# Patient Record
Sex: FEMALE | Race: BLACK OR AFRICAN AMERICAN | NOT HISPANIC OR LATINO | Employment: FULL TIME | RURAL
[De-identification: names, ages, dates, MRNs, and addresses within clinical notes are randomized per-mention and may not be internally consistent; named-entity substitution may affect disease eponyms.]

---

## 2017-05-04 ENCOUNTER — HISTORICAL (OUTPATIENT)
Dept: ADMINISTRATIVE | Facility: HOSPITAL | Age: 37
End: 2017-05-04

## 2017-05-08 LAB
LAB AP CLINICAL INFORMATION: NORMAL
LAB AP COMMENTS: NORMAL
LAB AP GENERAL CAT - HISTORICAL: NORMAL
LAB AP INTERPRETATION/RESULT - HISTORICAL: NEGATIVE
LAB AP SPECIMEN ADEQUACY - HISTORICAL: NORMAL
LAB AP SPECIMEN SUBMITTED - HISTORICAL: NORMAL

## 2018-11-20 ENCOUNTER — HISTORICAL (OUTPATIENT)
Dept: ADMINISTRATIVE | Facility: HOSPITAL | Age: 38
End: 2018-11-20

## 2018-11-23 LAB
LAB AP COMMENTS: NORMAL
LAB AP GENERAL CAT - HISTORICAL: NORMAL
LAB AP INTERPRETATION/RESULT - HISTORICAL: NEGATIVE
LAB AP SPECIMEN ADEQUACY - HISTORICAL: NORMAL
LAB AP SPECIMEN SUBMITTED - HISTORICAL: NORMAL

## 2020-06-05 ENCOUNTER — HISTORICAL (OUTPATIENT)
Dept: ADMINISTRATIVE | Facility: HOSPITAL | Age: 40
End: 2020-06-05

## 2020-06-10 LAB
LAB AP CLINICAL INFORMATION: NORMAL
LAB AP GENERAL CAT - HISTORICAL: NORMAL
LAB AP INTERPRETATION/RESULT - HISTORICAL: NEGATIVE
LAB AP SPECIMEN ADEQUACY - HISTORICAL: NORMAL
LAB AP SPECIMEN SUBMITTED - HISTORICAL: NORMAL

## 2020-11-12 ENCOUNTER — HISTORICAL (OUTPATIENT)
Dept: ADMINISTRATIVE | Facility: HOSPITAL | Age: 40
End: 2020-11-12

## 2021-01-22 ENCOUNTER — HISTORICAL (OUTPATIENT)
Dept: ADMINISTRATIVE | Facility: HOSPITAL | Age: 41
End: 2021-01-22

## 2021-01-22 LAB — SARS-COV+SARS-COV-2 AG RESP QL IA.RAPID: POSITIVE

## 2021-02-05 ENCOUNTER — HISTORICAL (OUTPATIENT)
Dept: ADMINISTRATIVE | Facility: HOSPITAL | Age: 41
End: 2021-02-05

## 2021-02-05 LAB — SARS-COV+SARS-COV-2 AG RESP QL IA.RAPID: NEGATIVE

## 2021-03-24 ENCOUNTER — OFFICE VISIT (OUTPATIENT)
Dept: FAMILY MEDICINE | Facility: CLINIC | Age: 41
End: 2021-03-24
Payer: COMMERCIAL

## 2021-03-24 VITALS
SYSTOLIC BLOOD PRESSURE: 124 MMHG | DIASTOLIC BLOOD PRESSURE: 88 MMHG | HEIGHT: 69 IN | BODY MASS INDEX: 27.99 KG/M2 | TEMPERATURE: 99 F | WEIGHT: 189 LBS

## 2021-03-24 DIAGNOSIS — L84 CALLUS OF FOOT: Primary | ICD-10-CM

## 2021-03-24 DIAGNOSIS — R39.15 URINARY URGENCY: ICD-10-CM

## 2021-03-24 DIAGNOSIS — Z90.710 S/P COMPLETE HYSTERECTOMY: ICD-10-CM

## 2021-03-24 PROCEDURE — 3008F BODY MASS INDEX DOCD: CPT | Mod: ,,, | Performed by: NURSE PRACTITIONER

## 2021-03-24 PROCEDURE — 99213 OFFICE O/P EST LOW 20 MIN: CPT | Mod: ,,, | Performed by: NURSE PRACTITIONER

## 2021-03-24 PROCEDURE — 3008F PR BODY MASS INDEX (BMI) DOCUMENTED: ICD-10-PCS | Mod: ,,, | Performed by: NURSE PRACTITIONER

## 2021-03-24 PROCEDURE — 99213 PR OFFICE/OUTPT VISIT, EST, LEVL III, 20-29 MIN: ICD-10-PCS | Mod: ,,, | Performed by: NURSE PRACTITIONER

## 2021-03-29 ENCOUNTER — TELEPHONE (OUTPATIENT)
Dept: FAMILY MEDICINE | Facility: CLINIC | Age: 41
End: 2021-03-29

## 2021-04-08 ENCOUNTER — TELEPHONE (OUTPATIENT)
Dept: PRIMARY CARE CLINIC | Facility: CLINIC | Age: 41
End: 2021-04-08

## 2021-04-12 ENCOUNTER — OFFICE VISIT (OUTPATIENT)
Dept: PRIMARY CARE CLINIC | Facility: CLINIC | Age: 41
End: 2021-04-12
Payer: COMMERCIAL

## 2021-04-12 VITALS
WEIGHT: 186 LBS | HEART RATE: 78 BPM | SYSTOLIC BLOOD PRESSURE: 110 MMHG | TEMPERATURE: 99 F | HEIGHT: 62 IN | OXYGEN SATURATION: 98 % | BODY MASS INDEX: 34.23 KG/M2 | RESPIRATION RATE: 14 BRPM | DIASTOLIC BLOOD PRESSURE: 60 MMHG

## 2021-04-12 DIAGNOSIS — R31.0 GROSS HEMATURIA: Primary | ICD-10-CM

## 2021-04-12 DIAGNOSIS — F17.200 CURRENT SMOKER: ICD-10-CM

## 2021-04-12 DIAGNOSIS — Z87.440 HISTORY OF RECURRENT UTI (URINARY TRACT INFECTION): Chronic | ICD-10-CM

## 2021-04-12 DIAGNOSIS — R10.9 ABDOMINAL PAIN, UNSPECIFIED ABDOMINAL LOCATION: ICD-10-CM

## 2021-04-12 DIAGNOSIS — Z90.710 S/P COMPLETE HYSTERECTOMY: ICD-10-CM

## 2021-04-12 DIAGNOSIS — R39.15 URINARY URGENCY: ICD-10-CM

## 2021-04-12 DIAGNOSIS — N39.41 URGE INCONTINENCE OF URINE: ICD-10-CM

## 2021-04-12 PROCEDURE — 99215 OFFICE O/P EST HI 40 MIN: CPT | Mod: ,,, | Performed by: NURSE PRACTITIONER

## 2021-04-12 PROCEDURE — 51798 PR MEAS,POST-VOID RES,US,NON-IMAGING: ICD-10-PCS | Mod: ,,, | Performed by: NURSE PRACTITIONER

## 2021-04-12 PROCEDURE — 51798 US URINE CAPACITY MEASURE: CPT | Mod: ,,, | Performed by: NURSE PRACTITIONER

## 2021-04-12 PROCEDURE — 99215 PR OFFICE/OUTPT VISIT, EST, LEVL V, 40-54 MIN: ICD-10-PCS | Mod: ,,, | Performed by: NURSE PRACTITIONER

## 2021-04-12 RX ORDER — MIRABEGRON 25 MG/1
50 TABLET, FILM COATED, EXTENDED RELEASE ORAL DAILY
Qty: 90 TABLET | Refills: 3 | COMMUNITY
Start: 2021-04-12 | End: 2021-04-28 | Stop reason: SDUPTHER

## 2021-04-14 ENCOUNTER — TELEPHONE (OUTPATIENT)
Dept: PRIMARY CARE CLINIC | Facility: CLINIC | Age: 41
End: 2021-04-14

## 2021-04-14 DIAGNOSIS — N30.01 ACUTE CYSTITIS WITH HEMATURIA: Primary | ICD-10-CM

## 2021-04-14 RX ORDER — CEFUROXIME AXETIL 500 MG/1
500 TABLET ORAL EVERY 12 HOURS
Qty: 20 TABLET | Refills: 0 | Status: SHIPPED | OUTPATIENT
Start: 2021-04-14 | End: 2021-12-16

## 2021-04-16 ENCOUNTER — HOSPITAL ENCOUNTER (OUTPATIENT)
Dept: RADIOLOGY | Facility: HOSPITAL | Age: 41
Discharge: HOME | End: 2021-04-16
Attending: NURSE PRACTITIONER
Payer: COMMERCIAL

## 2021-04-16 DIAGNOSIS — R10.9 ABDOMINAL PAIN, UNSPECIFIED ABDOMINAL LOCATION: ICD-10-CM

## 2021-04-16 PROCEDURE — 74178 CT ABD&PLV WO CNTR FLWD CNTR: CPT | Mod: TC

## 2021-04-16 PROCEDURE — 74178 CT ABDOMEN PELVIS W WO CONTRAST: ICD-10-PCS | Mod: 26,,, | Performed by: RADIOLOGY

## 2021-04-16 PROCEDURE — 74178 CT ABD&PLV WO CNTR FLWD CNTR: CPT | Mod: 26,,, | Performed by: RADIOLOGY

## 2021-04-16 PROCEDURE — 25500020 PHARM REV CODE 255: Performed by: NURSE PRACTITIONER

## 2021-04-16 RX ADMIN — IOPAMIDOL 100 ML: 755 INJECTION, SOLUTION INTRAVENOUS at 08:04

## 2021-04-20 ENCOUNTER — TELEPHONE (OUTPATIENT)
Dept: PRIMARY CARE CLINIC | Facility: CLINIC | Age: 41
End: 2021-04-20

## 2021-04-28 ENCOUNTER — OFFICE VISIT (OUTPATIENT)
Dept: PRIMARY CARE CLINIC | Facility: CLINIC | Age: 41
End: 2021-04-28
Payer: COMMERCIAL

## 2021-04-28 VITALS
HEIGHT: 62 IN | TEMPERATURE: 99 F | OXYGEN SATURATION: 100 % | BODY MASS INDEX: 34.41 KG/M2 | RESPIRATION RATE: 16 BRPM | DIASTOLIC BLOOD PRESSURE: 60 MMHG | SYSTOLIC BLOOD PRESSURE: 100 MMHG | HEART RATE: 80 BPM | WEIGHT: 187 LBS

## 2021-04-28 DIAGNOSIS — R31.0 GROSS HEMATURIA: ICD-10-CM

## 2021-04-28 DIAGNOSIS — N39.41 URGE INCONTINENCE: Primary | ICD-10-CM

## 2021-04-28 DIAGNOSIS — Z87.440 HISTORY OF RECURRENT UTI (URINARY TRACT INFECTION): ICD-10-CM

## 2021-04-28 PROCEDURE — 99214 PR OFFICE/OUTPT VISIT, EST, LEVL IV, 30-39 MIN: ICD-10-PCS | Mod: ,,, | Performed by: NURSE PRACTITIONER

## 2021-04-28 PROCEDURE — 99214 OFFICE O/P EST MOD 30 MIN: CPT | Mod: ,,, | Performed by: NURSE PRACTITIONER

## 2021-04-28 PROCEDURE — 1125F AMNT PAIN NOTED PAIN PRSNT: CPT | Mod: ,,, | Performed by: NURSE PRACTITIONER

## 2021-04-28 PROCEDURE — 1125F PR PAIN SEVERITY QUANTIFIED, PAIN PRESENT: ICD-10-PCS | Mod: ,,, | Performed by: NURSE PRACTITIONER

## 2021-04-28 PROCEDURE — 3008F PR BODY MASS INDEX (BMI) DOCUMENTED: ICD-10-PCS | Mod: ,,, | Performed by: NURSE PRACTITIONER

## 2021-04-28 PROCEDURE — 3008F BODY MASS INDEX DOCD: CPT | Mod: ,,, | Performed by: NURSE PRACTITIONER

## 2021-04-28 RX ORDER — METHENAMINE HIPPURATE 1000 MG/1
1 TABLET ORAL 2 TIMES DAILY
Qty: 180 TABLET | Refills: 1 | Status: SHIPPED | OUTPATIENT
Start: 2021-04-28 | End: 2021-12-16

## 2021-05-05 ENCOUNTER — OFFICE VISIT (OUTPATIENT)
Dept: UROLOGY | Facility: CLINIC | Age: 41
End: 2021-05-05
Payer: COMMERCIAL

## 2021-05-05 VITALS
WEIGHT: 187 LBS | DIASTOLIC BLOOD PRESSURE: 60 MMHG | SYSTOLIC BLOOD PRESSURE: 90 MMHG | HEIGHT: 62 IN | BODY MASS INDEX: 34.41 KG/M2

## 2021-05-05 DIAGNOSIS — R10.2 PELVIC PAIN IN FEMALE: ICD-10-CM

## 2021-05-05 DIAGNOSIS — N39.0 URINARY TRACT INFECTION WITH HEMATURIA, SITE UNSPECIFIED: ICD-10-CM

## 2021-05-05 DIAGNOSIS — R31.0 GROSS HEMATURIA: ICD-10-CM

## 2021-05-05 DIAGNOSIS — N36.1 URETHRAL DIVERTICULUM: Primary | ICD-10-CM

## 2021-05-05 DIAGNOSIS — R31.9 HEMATURIA, UNSPECIFIED TYPE: Primary | ICD-10-CM

## 2021-05-05 DIAGNOSIS — R31.9 URINARY TRACT INFECTION WITH HEMATURIA, SITE UNSPECIFIED: ICD-10-CM

## 2021-05-05 PROCEDURE — 87086 CULTURE, URINE: ICD-10-PCS | Mod: ,,, | Performed by: CLINICAL MEDICAL LABORATORY

## 2021-05-05 PROCEDURE — 99213 HC OFFICE/OUTPT VISIT, EST, LEVL III, 20-29 MIN: ICD-10-PCS | Mod: PBBFAC,,, | Performed by: UROLOGY

## 2021-05-05 PROCEDURE — 99213 OFFICE O/P EST LOW 20 MIN: CPT | Mod: PBBFAC,,, | Performed by: UROLOGY

## 2021-05-05 PROCEDURE — 3008F PR BODY MASS INDEX (BMI) DOCUMENTED: ICD-10-PCS | Mod: ,,, | Performed by: UROLOGY

## 2021-05-05 PROCEDURE — 99024 POSTOP FOLLOW-UP VISIT: CPT | Mod: ,,, | Performed by: UROLOGY

## 2021-05-05 PROCEDURE — 1125F AMNT PAIN NOTED PAIN PRSNT: CPT | Mod: ,,, | Performed by: UROLOGY

## 2021-05-05 PROCEDURE — 87086 URINE CULTURE/COLONY COUNT: CPT | Mod: ,,, | Performed by: CLINICAL MEDICAL LABORATORY

## 2021-05-05 PROCEDURE — 1125F PR PAIN SEVERITY QUANTIFIED, PAIN PRESENT: ICD-10-PCS | Mod: ,,, | Performed by: UROLOGY

## 2021-05-05 PROCEDURE — 99024 PR POST-OP FOLLOW-UP VISIT: ICD-10-PCS | Mod: ,,, | Performed by: UROLOGY

## 2021-05-05 PROCEDURE — 99213 OFFICE O/P EST LOW 20 MIN: CPT | Mod: PBBFAC | Performed by: UROLOGY

## 2021-05-05 PROCEDURE — 3008F BODY MASS INDEX DOCD: CPT | Mod: ,,, | Performed by: UROLOGY

## 2021-05-08 LAB — UA COMPLETE W REFLEX CULTURE PNL UR: NORMAL

## 2021-05-12 ENCOUNTER — PROCEDURE VISIT (OUTPATIENT)
Dept: UROLOGY | Facility: CLINIC | Age: 41
End: 2021-05-12
Payer: COMMERCIAL

## 2021-05-12 VITALS
WEIGHT: 187 LBS | HEART RATE: 72 BPM | DIASTOLIC BLOOD PRESSURE: 68 MMHG | BODY MASS INDEX: 34.41 KG/M2 | HEIGHT: 62 IN | SYSTOLIC BLOOD PRESSURE: 98 MMHG

## 2021-05-12 DIAGNOSIS — N36.1 URETHRAL DIVERTICULUM: Primary | ICD-10-CM

## 2021-05-12 DIAGNOSIS — N39.3 URINARY, INCONTINENCE, STRESS FEMALE: ICD-10-CM

## 2021-05-12 DIAGNOSIS — N30.20 CHRONIC CYSTITIS: ICD-10-CM

## 2021-05-12 PROCEDURE — 52000 CYSTOURETHROSCOPY: CPT | Mod: PBBFAC | Performed by: UROLOGY

## 2021-05-12 PROCEDURE — 52000 CYSTOURETHROSCOPY: CPT | Mod: S$PBB,,, | Performed by: UROLOGY

## 2021-05-12 PROCEDURE — 52000 PR CYSTOURETHROSCOPY: ICD-10-PCS | Mod: S$PBB,,, | Performed by: UROLOGY

## 2021-06-03 DIAGNOSIS — G89.29 CHRONIC PELVIC PAIN IN FEMALE: Primary | ICD-10-CM

## 2021-06-03 DIAGNOSIS — R10.2 CHRONIC PELVIC PAIN IN FEMALE: Primary | ICD-10-CM

## 2021-06-03 RX ORDER — AMITRIPTYLINE HYDROCHLORIDE 25 MG/1
TABLET, FILM COATED ORAL
Qty: 60 TABLET | Refills: 1 | Status: SHIPPED | OUTPATIENT
Start: 2021-06-03 | End: 2021-12-16

## 2021-12-16 ENCOUNTER — OFFICE VISIT (OUTPATIENT)
Dept: FAMILY MEDICINE | Facility: CLINIC | Age: 41
End: 2021-12-16
Payer: COMMERCIAL

## 2021-12-16 VITALS
HEART RATE: 83 BPM | WEIGHT: 202 LBS | TEMPERATURE: 99 F | SYSTOLIC BLOOD PRESSURE: 100 MMHG | BODY MASS INDEX: 37.17 KG/M2 | OXYGEN SATURATION: 100 % | DIASTOLIC BLOOD PRESSURE: 60 MMHG | HEIGHT: 62 IN

## 2021-12-16 DIAGNOSIS — F33.1 MODERATE EPISODE OF RECURRENT MAJOR DEPRESSIVE DISORDER: Primary | ICD-10-CM

## 2021-12-16 DIAGNOSIS — Z72.0 TOBACCO ABUSE: ICD-10-CM

## 2021-12-16 PROBLEM — N36.1 URETHRAL DIVERTICULUM: Status: ACTIVE | Noted: 2021-11-08

## 2021-12-16 PROCEDURE — 99214 OFFICE O/P EST MOD 30 MIN: CPT | Mod: ,,, | Performed by: FAMILY MEDICINE

## 2021-12-16 PROCEDURE — 99214 PR OFFICE/OUTPT VISIT, EST, LEVL IV, 30-39 MIN: ICD-10-PCS | Mod: ,,, | Performed by: FAMILY MEDICINE

## 2021-12-16 RX ORDER — BUPROPION HYDROCHLORIDE 150 MG/1
TABLET, FILM COATED, EXTENDED RELEASE ORAL
Qty: 57 TABLET | Refills: 0 | Status: SHIPPED | OUTPATIENT
Start: 2021-12-16 | End: 2023-09-20

## 2023-09-20 ENCOUNTER — OFFICE VISIT (OUTPATIENT)
Dept: FAMILY MEDICINE | Facility: CLINIC | Age: 43
End: 2023-09-20
Payer: COMMERCIAL

## 2023-09-20 VITALS
HEIGHT: 62 IN | BODY MASS INDEX: 34.94 KG/M2 | WEIGHT: 189.88 LBS | OXYGEN SATURATION: 98 % | DIASTOLIC BLOOD PRESSURE: 61 MMHG | TEMPERATURE: 98 F | HEART RATE: 75 BPM | SYSTOLIC BLOOD PRESSURE: 101 MMHG

## 2023-09-20 DIAGNOSIS — Z11.59 ENCOUNTER FOR HEPATITIS C SCREENING TEST FOR LOW RISK PATIENT: ICD-10-CM

## 2023-09-20 DIAGNOSIS — Z76.89 ENCOUNTER TO ESTABLISH CARE: ICD-10-CM

## 2023-09-20 DIAGNOSIS — Z12.31 SCREENING MAMMOGRAM FOR BREAST CANCER: ICD-10-CM

## 2023-09-20 DIAGNOSIS — Z13.31 POSITIVE DEPRESSION SCREENING: ICD-10-CM

## 2023-09-20 DIAGNOSIS — Z13.29 SCREENING FOR THYROID DISORDER: ICD-10-CM

## 2023-09-20 DIAGNOSIS — F41.1 GENERALIZED ANXIETY DISORDER: Primary | ICD-10-CM

## 2023-09-20 DIAGNOSIS — Z13.220 SCREENING FOR LIPID DISORDERS: ICD-10-CM

## 2023-09-20 DIAGNOSIS — Z13.1 SCREENING FOR DIABETES MELLITUS: ICD-10-CM

## 2023-09-20 LAB
ALBUMIN SERPL BCP-MCNC: 3.7 G/DL (ref 3.5–5)
ALBUMIN/GLOB SERPL: 1.1 {RATIO}
ALP SERPL-CCNC: 49 U/L (ref 37–98)
ALT SERPL W P-5'-P-CCNC: 13 U/L (ref 13–56)
ANION GAP SERPL CALCULATED.3IONS-SCNC: 7 MMOL/L (ref 7–16)
AST SERPL W P-5'-P-CCNC: 6 U/L (ref 15–37)
BASOPHILS # BLD AUTO: 0.06 K/UL (ref 0–0.2)
BASOPHILS NFR BLD AUTO: 1.1 % (ref 0–1)
BILIRUB SERPL-MCNC: 0.4 MG/DL (ref ?–1.2)
BUN SERPL-MCNC: 5 MG/DL (ref 7–18)
BUN/CREAT SERPL: 6 (ref 6–20)
CALCIUM SERPL-MCNC: 8.5 MG/DL (ref 8.5–10.1)
CHLORIDE SERPL-SCNC: 106 MMOL/L (ref 98–107)
CHOLEST SERPL-MCNC: 138 MG/DL (ref 0–200)
CHOLEST/HDLC SERPL: 3.2 {RATIO}
CO2 SERPL-SCNC: 29 MMOL/L (ref 21–32)
CREAT SERPL-MCNC: 0.85 MG/DL (ref 0.55–1.02)
DIFFERENTIAL METHOD BLD: ABNORMAL
EGFR (NO RACE VARIABLE) (RUSH/TITUS): 89 ML/MIN/1.73M2
EOSINOPHIL # BLD AUTO: 0.06 K/UL (ref 0–0.5)
EOSINOPHIL NFR BLD AUTO: 1.1 % (ref 1–4)
ERYTHROCYTE [DISTWIDTH] IN BLOOD BY AUTOMATED COUNT: 12.3 % (ref 11.5–14.5)
EST. AVERAGE GLUCOSE BLD GHB EST-MCNC: 84 MG/DL
GLOBULIN SER-MCNC: 3.3 G/DL (ref 2–4)
GLUCOSE SERPL-MCNC: 66 MG/DL (ref 74–106)
HBA1C MFR BLD HPLC: 5.1 % (ref 4.5–6.6)
HCT VFR BLD AUTO: 35.3 % (ref 38–47)
HCV AB SER QL: NORMAL
HDLC SERPL-MCNC: 43 MG/DL (ref 40–60)
HGB BLD-MCNC: 12.5 G/DL (ref 12–16)
IMM GRANULOCYTES # BLD AUTO: 0.01 K/UL (ref 0–0.04)
IMM GRANULOCYTES NFR BLD: 0.2 % (ref 0–0.4)
LDLC SERPL CALC-MCNC: 77 MG/DL
LDLC/HDLC SERPL: 1.8 {RATIO}
LYMPHOCYTES # BLD AUTO: 2 K/UL (ref 1–4.8)
LYMPHOCYTES NFR BLD AUTO: 37.5 % (ref 27–41)
MCH RBC QN AUTO: 33.2 PG (ref 27–31)
MCHC RBC AUTO-ENTMCNC: 35.4 G/DL (ref 32–36)
MCV RBC AUTO: 93.9 FL (ref 80–96)
MONOCYTES # BLD AUTO: 0.44 K/UL (ref 0–0.8)
MONOCYTES NFR BLD AUTO: 8.2 % (ref 2–6)
MPC BLD CALC-MCNC: 10.8 FL (ref 9.4–12.4)
NEUTROPHILS # BLD AUTO: 2.77 K/UL (ref 1.8–7.7)
NEUTROPHILS NFR BLD AUTO: 51.9 % (ref 53–65)
NONHDLC SERPL-MCNC: 95 MG/DL
NRBC # BLD AUTO: 0 X10E3/UL
NRBC, AUTO (.00): 0 %
PLATELET # BLD AUTO: 269 K/UL (ref 150–400)
POTASSIUM SERPL-SCNC: 4.1 MMOL/L (ref 3.5–5.1)
PROT SERPL-MCNC: 7 G/DL (ref 6.4–8.2)
RBC # BLD AUTO: 3.76 M/UL (ref 4.2–5.4)
SODIUM SERPL-SCNC: 138 MMOL/L (ref 136–145)
T4 FREE SERPL-MCNC: 1.01 NG/DL (ref 0.76–1.46)
TRIGL SERPL-MCNC: 92 MG/DL (ref 35–150)
TSH SERPL DL<=0.005 MIU/L-ACNC: 0.97 UIU/ML (ref 0.36–3.74)
VLDLC SERPL-MCNC: 18 MG/DL
WBC # BLD AUTO: 5.34 K/UL (ref 4.5–11)

## 2023-09-20 PROCEDURE — 83036 HEMOGLOBIN A1C: ICD-10-PCS | Mod: ,,, | Performed by: CLINICAL MEDICAL LABORATORY

## 2023-09-20 PROCEDURE — 84439 ASSAY OF FREE THYROXINE: CPT | Mod: ,,, | Performed by: CLINICAL MEDICAL LABORATORY

## 2023-09-20 PROCEDURE — 3008F BODY MASS INDEX DOCD: CPT | Mod: ,,, | Performed by: STUDENT IN AN ORGANIZED HEALTH CARE EDUCATION/TRAINING PROGRAM

## 2023-09-20 PROCEDURE — 80050 GENERAL HEALTH PANEL: CPT | Mod: ,,, | Performed by: CLINICAL MEDICAL LABORATORY

## 2023-09-20 PROCEDURE — 3044F PR MOST RECENT HEMOGLOBIN A1C LEVEL <7.0%: ICD-10-PCS | Mod: ,,, | Performed by: STUDENT IN AN ORGANIZED HEALTH CARE EDUCATION/TRAINING PROGRAM

## 2023-09-20 PROCEDURE — 3044F HG A1C LEVEL LT 7.0%: CPT | Mod: ,,, | Performed by: STUDENT IN AN ORGANIZED HEALTH CARE EDUCATION/TRAINING PROGRAM

## 2023-09-20 PROCEDURE — 80061 LIPID PANEL: ICD-10-PCS | Mod: ,,, | Performed by: CLINICAL MEDICAL LABORATORY

## 2023-09-20 PROCEDURE — 84439 T4, FREE: ICD-10-PCS | Mod: ,,, | Performed by: CLINICAL MEDICAL LABORATORY

## 2023-09-20 PROCEDURE — 1159F MED LIST DOCD IN RCRD: CPT | Mod: ,,, | Performed by: STUDENT IN AN ORGANIZED HEALTH CARE EDUCATION/TRAINING PROGRAM

## 2023-09-20 PROCEDURE — 86803 HEPATITIS C AB TEST: CPT | Mod: ,,, | Performed by: CLINICAL MEDICAL LABORATORY

## 2023-09-20 PROCEDURE — 3074F SYST BP LT 130 MM HG: CPT | Mod: ,,, | Performed by: STUDENT IN AN ORGANIZED HEALTH CARE EDUCATION/TRAINING PROGRAM

## 2023-09-20 PROCEDURE — 99214 OFFICE O/P EST MOD 30 MIN: CPT | Mod: ,,, | Performed by: STUDENT IN AN ORGANIZED HEALTH CARE EDUCATION/TRAINING PROGRAM

## 2023-09-20 PROCEDURE — 80050 PR  GENERAL HEALTH PANEL: ICD-10-PCS | Mod: ,,, | Performed by: CLINICAL MEDICAL LABORATORY

## 2023-09-20 PROCEDURE — 3078F PR MOST RECENT DIASTOLIC BLOOD PRESSURE < 80 MM HG: ICD-10-PCS | Mod: ,,, | Performed by: STUDENT IN AN ORGANIZED HEALTH CARE EDUCATION/TRAINING PROGRAM

## 2023-09-20 PROCEDURE — 99214 PR OFFICE/OUTPT VISIT, EST, LEVL IV, 30-39 MIN: ICD-10-PCS | Mod: ,,, | Performed by: STUDENT IN AN ORGANIZED HEALTH CARE EDUCATION/TRAINING PROGRAM

## 2023-09-20 PROCEDURE — 80061 LIPID PANEL: CPT | Mod: ,,, | Performed by: CLINICAL MEDICAL LABORATORY

## 2023-09-20 PROCEDURE — 1159F PR MEDICATION LIST DOCUMENTED IN MEDICAL RECORD: ICD-10-PCS | Mod: ,,, | Performed by: STUDENT IN AN ORGANIZED HEALTH CARE EDUCATION/TRAINING PROGRAM

## 2023-09-20 PROCEDURE — 3008F PR BODY MASS INDEX (BMI) DOCUMENTED: ICD-10-PCS | Mod: ,,, | Performed by: STUDENT IN AN ORGANIZED HEALTH CARE EDUCATION/TRAINING PROGRAM

## 2023-09-20 PROCEDURE — 83036 HEMOGLOBIN GLYCOSYLATED A1C: CPT | Mod: ,,, | Performed by: CLINICAL MEDICAL LABORATORY

## 2023-09-20 PROCEDURE — 3074F PR MOST RECENT SYSTOLIC BLOOD PRESSURE < 130 MM HG: ICD-10-PCS | Mod: ,,, | Performed by: STUDENT IN AN ORGANIZED HEALTH CARE EDUCATION/TRAINING PROGRAM

## 2023-09-20 PROCEDURE — 86803 HEPATITIS C ANTIBODY: ICD-10-PCS | Mod: ,,, | Performed by: CLINICAL MEDICAL LABORATORY

## 2023-09-20 PROCEDURE — 3078F DIAST BP <80 MM HG: CPT | Mod: ,,, | Performed by: STUDENT IN AN ORGANIZED HEALTH CARE EDUCATION/TRAINING PROGRAM

## 2023-09-20 RX ORDER — BUPROPION HYDROCHLORIDE 150 MG/1
150 TABLET ORAL DAILY
Qty: 30 TABLET | Refills: 0 | Status: SHIPPED | OUTPATIENT
Start: 2023-09-20 | End: 2024-09-19

## 2023-09-20 NOTE — PROGRESS NOTES
"[facility]    Chief Complaint      Chief Complaint   Patient presents with    Establish Care     Pt here to [facility], she was pt of Dr. Velazquez. She's been having issues with anxiety & depression, she's started having panic attack at nights. There's been several events that have taken place since 2021 to presence.       History of Present Illness      Ileana Bradley is a 42 y.o. female with chronic conditions of anemia, anxiety, current smoker  who presents today for to [facility] care, states is struggling with anxiety and depression.  Endorses PTSD like issues since several of her family members "got killed".     Past Medical History:  Past Medical History:   Diagnosis Date    Anemia     Anxiety     Current smoker 4/12/2021    Encounter for blood transfusion     History of recurrent UTI (urinary tract infection) 4/12/2021    Sickle cell trait     Urinary urgency 3/24/2021       Past Surgical History:   has a past surgical history that includes Hysterectomy; Adenoidectomy; Tonsillectomy; Ovarian cyst surgery; and Uterine fibroid surgery.    Social History:  Social History     Tobacco Use    Smoking status: Some Days     Current packs/day: 0.50     Types: Cigarettes    Smokeless tobacco: Never   Substance Use Topics    Alcohol use: Yes     Comment: social    Drug use: Never       I personally reviewed all past medical, surgical, and social.     Review of Systems   Constitutional:  Negative for fatigue and fever.   HENT:  Negative for sore throat.    Respiratory:  Negative for shortness of breath.    Cardiovascular:  Negative for chest pain.   Gastrointestinal:  Negative for abdominal pain.   Genitourinary:  Negative for dysuria.   Musculoskeletal:  Negative for back pain.   Integumentary:  Negative for rash.   Neurological:  Negative for headaches.   Psychiatric/Behavioral:  Negative for agitation, behavioral problems, confusion, decreased concentration, sleep disturbance and suicidal ideas. The " "patient is nervous/anxious.    All other systems reviewed and are negative.       Medications:  Outpatient Encounter Medications as of 9/20/2023   Medication Sig Dispense Refill    buPROPion (WELLBUTRIN XL) 150 MG TB24 tablet Take 1 tablet (150 mg total) by mouth once daily. 30 tablet 0    [DISCONTINUED] buPROPion HCL, smoking deter, (ZYBAN) 150 mg TBSR 12 hr tablet Take one tablet daily for 3 days, then increase to one tablet twice daily. Start medicine 1 week before smoking quit date. 57 tablet 0     No facility-administered encounter medications on file as of 9/20/2023.       Allergies:  Review of patient's allergies indicates:   Allergen Reactions    Iodine and iodide containing products        Health Maintenance:    There is no immunization history on file for this patient.   Health Maintenance   Topic Date Due    TETANUS VACCINE  Never done    Mammogram  11/12/2021    Hepatitis C Screening  Completed    Lipid Panel  Completed        Physical Exam      Vital Signs  Temp: 98 °F (36.7 °C)  Temp Source: Oral  Pulse: 75  SpO2: 98 %  BP: 101/61  BP Location: Right arm  Patient Position: Sitting  Height and Weight  Height: 5' 2" (157.5 cm)  Weight: 86.1 kg (189 lb 14.4 oz)  BSA (Calculated - sq m): 1.94 sq meters  BMI (Calculated): 34.7  Weight in (lb) to have BMI = 25: 136.4]    Physical Exam  Psychiatric:         Attention and Perception: Attention normal.         Mood and Affect: Mood is anxious. Mood is not depressed. Affect is not flat, angry, tearful or inappropriate.         Speech: Speech normal.         Behavior: Behavior normal.         Thought Content: Thought content normal.         Cognition and Memory: Cognition and memory normal.         Judgment: Judgment normal.          Laboratory:  CBC:  Recent Labs   Lab 09/20/23  1034   WBC 5.34   RBC 3.76 L   Hemoglobin 12.5   Hematocrit 35.3 L   Platelet Count 269   MCV 93.9   MCH 33.2 H   MCHC 35.4     CMP:  Recent Labs   Lab 09/20/23  1034   Glucose 66 L "   Calcium 8.5   Albumin 3.7   Total Protein 7.0   Sodium 138   Potassium 4.1   CO2 29   Chloride 106   BUN 5 L   Alk Phos 49   ALT 13   AST 6 L   Bilirubin, Total 0.4     LIPIDS:  Recent Labs   Lab 09/20/23  1034   TSH 0.968   HDL Cholesterol 43   Cholesterol 138   Triglycerides 92   LDL Calculated 77   Cholesterol/HDL Ratio (Risk Factor) 3.2   Non-HDL 95     TSH:  Recent Labs   Lab 09/20/23  1034   TSH 0.968     A1C:  Recent Labs   Lab 09/20/23  1034   Hemoglobin A1C 5.1       Assessment/Plan     Ileana Bradley is a 42 y.o.female with:    1. Positive depression screening  Comments:  I have reviewed the positive depression score which warrants active treatment with psychotherapy and/or medications.    2. Generalized anxiety disorder      BAN score -14 moderate anxiety        Chronic conditions status updated as per HPI.  Other than changes above, cont current medications and maintain follow up with specialists.  Return to clinic in 1 months.    Patient Instructions   Set a time to talk with a counselor about your worries and feelings. This can help you with your anxiety.  Take care to follow all instructions when you take your medicines.  Limit alcohol and caffeine.  Learn ways to manage stress. Relaxation methods like reflection, deep breathing, and muscle relaxation may be helpful. Things like yoga, exercise, and raffaele chi are also good.  Talk about your feelings with family members and friends you trust. Talk to someone who can help you see how your thoughts at certain times may raise your anxiety.     Tammy Mccormick, FNP-BC  [facility]

## 2023-09-20 NOTE — PATIENT INSTRUCTIONS
Set a time to talk with a counselor about your worries and feelings. This can help you with your anxiety.  Take care to follow all instructions when you take your medicines.  Limit alcohol and caffeine.  Learn ways to manage stress. Relaxation methods like reflection, deep breathing, and muscle relaxation may be helpful. Things like yoga, exercise, and raffaele chi are also good.  Talk about your feelings with family members and friends you trust. Talk to someone who can help you see how your thoughts at certain times may raise your anxiety.

## 2023-09-20 NOTE — PROGRESS NOTES
"[facility]    Chief Complaint      Chief Complaint   Patient presents with    [facility] Care     Pt here to [facility], she was pt of Dr. Velazquez. She's been having issues with anxiety & depression, she's started having panic attack at nights. There's been several events that have taken place since 2021 to presence.       History of Present Illness    Ileana Bradley is a 42 y.o. female with chronic conditions of anemia, anxiety, current smoker  who presents today to [facility], states is struggling with anxiety and depression.  Endorses PTSD like issues since several of her family members "got killed".       Past Medical History:  Past Medical History:   Diagnosis Date    Anemia     Anxiety     Current smoker 4/12/2021    Encounter for blood transfusion     History of recurrent UTI (urinary tract infection) 4/12/2021    Sickle cell trait     Urinary urgency 3/24/2021       Past Surgical History:   has a past surgical history that includes Hysterectomy; Adenoidectomy; Tonsillectomy; Ovarian cyst surgery; and Uterine fibroid surgery.    Social History:  Social History     Tobacco Use    Smoking status: Some Days     Current packs/day: 0.50     Types: Cigarettes    Smokeless tobacco: Never   Substance Use Topics    Alcohol use: Yes     Comment: social    Drug use: Never       I personally reviewed all past medical, surgical, and social.        Review of Systems   Constitutional:  Negative for fatigue and fever.   HENT:  Negative for sore throat.    Respiratory:  Negative for shortness of breath.    Cardiovascular:  Negative for chest pain.   Gastrointestinal:  Negative for abdominal pain.   Genitourinary:  Negative for dysuria.   Musculoskeletal:  Negative for back pain.   Integumentary:  Negative for rash.   Neurological:  Negative for headaches.   Psychiatric/Behavioral:  Negative for agitation, behavioral problems, confusion, decreased concentration, sleep disturbance and suicidal ideas. The " "patient is nervous/anxious.    All other systems reviewed and are negative.        Medications:  Encounter Medications          Outpatient Encounter Medications as of 9/20/2023   Medication Sig Dispense Refill    buPROPion (WELLBUTRIN XL) 150 MG TB24 tablet Take 1 tablet (150 mg total) by mouth once daily. 30 tablet 0    [DISCONTINUED] buPROPion HCL, smoking deter, (ZYBAN) 150 mg TBSR 12 hr tablet Take one tablet daily for 3 days, then increase to one tablet twice daily. Start medicine 1 week before smoking quit date. 57 tablet 0      No facility-administered encounter medications on file as of 9/20/2023.            Allergies:       Review of patient's allergies indicates:   Allergen Reactions    Iodine and iodide containing products           Health Maintenance:     There is no immunization history on file for this patient.        Health Maintenance   Topic Date Due    TETANUS VACCINE  Never done    Mammogram  11/12/2021    Hepatitis C Screening  Completed    Lipid Panel  Completed         Physical Exam      Vital Signs  Temp: 98 °F (36.7 °C)  Temp Source: Oral  Pulse: 75  SpO2: 98 %  BP: 101/61  BP Location: Right arm  Patient Position: Sitting  Height and Weight  Height: 5' 2" (157.5 cm)  Weight: 86.1 kg (189 lb 14.4 oz)  BSA (Calculated - sq m): 1.94 sq meters  BMI (Calculated): 34.7  Weight in (lb) to have BMI = 25: 136.4]     Physical Exam  Physical Exam  Constitutional:       Appearance: Normal appearance. She is obese.   HENT:      Head: Normocephalic and atraumatic.      Right Ear: External ear normal.      Left Ear: External ear normal.      Nose: Nose normal.   Eyes:      Conjunctiva/sclera: Conjunctivae normal.   Neck:      Vascular: No carotid bruit.   Cardiovascular:      Rate and Rhythm: Normal rate and regular rhythm.   Pulmonary:      Effort: Pulmonary effort is normal.      Breath sounds: Normal breath sounds.   Musculoskeletal:         General: Normal range of motion.      Right lower leg: No " edema.      Left lower leg: No edema.   Skin:     General: Skin is warm and dry.   Neurological:      General: No focal deficit present.      Mental Status: She is alert and oriented to person, place, and time. Mental status is at baseline.     Psychiatric:         Attention and Perception: Attention normal.         Mood and Affect: Mood is anxious. Mood is not depressed. Affect is not flat, angry, tearful or inappropriate.         Speech: Speech normal.         Behavior: Behavior normal.         Thought Content: Thought content normal.         Cognition and Memory: Cognition and memory normal.         Judgment: Judgment normal.               Laboratory:  CBC:  Recent Labs   Lab 09/20/23  1034   WBC 5.34   RBC 3.76 L   Hemoglobin 12.5   Hematocrit 35.3 L   Platelet Count 269   MCV 93.9   MCH 33.2 H   MCHC 35.4     CMP:  Recent Labs   Lab 09/20/23  1034   Glucose 66 L   Calcium 8.5   Albumin 3.7   Total Protein 7.0   Sodium 138   Potassium 4.1   CO2 29   Chloride 106   BUN 5 L   Alk Phos 49   ALT 13   AST 6 L   Bilirubin, Total 0.4     LIPIDS:  Recent Labs   Lab 09/20/23  1034   TSH 0.968   HDL Cholesterol 43   Cholesterol 138   Triglycerides 92   LDL Calculated 77   Cholesterol/HDL Ratio (Risk Factor) 3.2   Non-HDL 95     TSH:  Recent Labs   Lab 09/20/23  1034   TSH 0.968     A1C:  Recent Labs   Lab 09/20/23  1034   Hemoglobin A1C 5.1       Assessment/Plan     Ileana Bradley is a 42 y.o.female with:    1. Generalized anxiety disorder  -     buPROPion (WELLBUTRIN XL) 150 MG TB24 tablet; Take 1 tablet (150 mg total) by mouth once daily.  Dispense: 30 tablet; Refill: 0    2. Positive depression screening  Comments:  I have reviewed the positive depression score which warrants active treatment with psychotherapy and/or medications.  Orders:  -     buPROPion (WELLBUTRIN XL) 150 MG TB24 tablet; Take 1 tablet (150 mg total) by mouth once daily.  Dispense: 30 tablet; Refill: 0    3. Encounter for hepatitis C screening  test for low risk patient  -     Hepatitis C Antibody; Future; Expected date: 09/20/2023    4. Screening for thyroid disorder  -     TSH; Future  -     T4, Free; Future    5. Screening for diabetes mellitus  -     Hemoglobin A1C; Future; Expected date: 09/20/2023    6. Screening for lipid disorders  -     Lipid Panel; Future    7. Screening mammogram for breast cancer  -     Mammo Digital Screening Bilat; Future; Expected date: 09/20/2023    8. Encounter to establish care  -     Comprehensive Metabolic Panel; Future  -     CBC Auto Differential; Future         Chronic conditions status updated as per HPI.  Other than changes above, cont current medications and maintain follow up with specialists.  Return to clinic in 1 months.    Patient Instructions   Set a time to talk with a counselor about your worries and feelings. This can help you with your anxiety.  Take care to follow all instructions when you take your medicines.  Limit alcohol and caffeine.  Learn ways to manage stress. Relaxation methods like reflection, deep breathing, and muscle relaxation may be helpful. Things like yoga, exercise, and raffaele chi are also good.  Talk about your feelings with family members and friends you trust. Talk to someone who can help you see how your thoughts at certain times may raise your anxiety.       Tammy Mccormick, FNP-BC  [facility]

## 2023-09-25 ENCOUNTER — PATIENT MESSAGE (OUTPATIENT)
Dept: FAMILY MEDICINE | Facility: CLINIC | Age: 43
End: 2023-09-25
Payer: COMMERCIAL

## 2023-09-25 ENCOUNTER — TELEPHONE (OUTPATIENT)
Dept: FAMILY MEDICINE | Facility: CLINIC | Age: 43
End: 2023-09-25
Payer: COMMERCIAL

## 2023-09-25 NOTE — TELEPHONE ENCOUNTER
----- Message from ANDRY Gregory sent at 9/25/2023  8:07 AM CDT -----  Please let pt know that her labs looked ok except for mild anemia.  Increase iron rich foods in diet. Take iron supplement with vitamin c otc daily.  We will recheck this at her next visit.   Advise that iron supplementation can cause constipation and discolorations of the stool. Be sure to stay adequately hydrated and take stool softener should constipation occur.

## 2025-06-09 ENCOUNTER — OFFICE VISIT (OUTPATIENT)
Dept: FAMILY MEDICINE | Facility: CLINIC | Age: 45
End: 2025-06-09
Payer: COMMERCIAL

## 2025-06-09 VITALS
WEIGHT: 173.13 LBS | DIASTOLIC BLOOD PRESSURE: 61 MMHG | BODY MASS INDEX: 31.86 KG/M2 | OXYGEN SATURATION: 100 % | HEIGHT: 62 IN | RESPIRATION RATE: 18 BRPM | HEART RATE: 76 BPM | SYSTOLIC BLOOD PRESSURE: 120 MMHG | TEMPERATURE: 98 F

## 2025-06-09 DIAGNOSIS — Z00.00 ENCOUNTER FOR MEDICAL EXAMINATION TO ESTABLISH CARE: ICD-10-CM

## 2025-06-09 DIAGNOSIS — Z00.00 ROUTINE GENERAL MEDICAL EXAMINATION AT A HEALTH CARE FACILITY: Primary | ICD-10-CM

## 2025-06-09 DIAGNOSIS — Z13.220 SCREENING FOR LIPOID DISORDERS: ICD-10-CM

## 2025-06-09 DIAGNOSIS — Z11.4 ENCOUNTER FOR SCREENING FOR HIV: ICD-10-CM

## 2025-06-09 DIAGNOSIS — R53.83 FATIGUE, UNSPECIFIED TYPE: ICD-10-CM

## 2025-06-09 DIAGNOSIS — N30.00 ACUTE CYSTITIS WITHOUT HEMATURIA: ICD-10-CM

## 2025-06-09 DIAGNOSIS — Z13.1 SCREENING FOR DIABETES MELLITUS: ICD-10-CM

## 2025-06-09 DIAGNOSIS — F17.200 CURRENT EVERY DAY SMOKER: ICD-10-CM

## 2025-06-09 DIAGNOSIS — E55.9 VITAMIN D DEFICIENCY: ICD-10-CM

## 2025-06-09 DIAGNOSIS — F41.9 ANXIETY: ICD-10-CM

## 2025-06-09 DIAGNOSIS — Z13.31 POSITIVE DEPRESSION SCREENING: ICD-10-CM

## 2025-06-09 DIAGNOSIS — Z87.440 HISTORY OF RECURRENT UTI (URINARY TRACT INFECTION): Chronic | ICD-10-CM

## 2025-06-09 DIAGNOSIS — Z12.31 ENCOUNTER FOR SCREENING MAMMOGRAM FOR MALIGNANT NEOPLASM OF BREAST: ICD-10-CM

## 2025-06-09 PROBLEM — F41.1 GENERALIZED ANXIETY DISORDER: Status: ACTIVE | Noted: 2025-06-09

## 2025-06-09 PROBLEM — R31.0 GROSS HEMATURIA: Status: RESOLVED | Noted: 2021-04-12 | Resolved: 2025-06-09

## 2025-06-09 LAB
25(OH)D3 SERPL-MCNC: 15.1 NG/ML (ref 30–80)
ALBUMIN SERPL BCP-MCNC: 4.2 G/DL (ref 3.5–5)
ALBUMIN/GLOB SERPL: 1.2 {RATIO}
ALP SERPL-CCNC: 39 U/L (ref 40–150)
ALT SERPL W P-5'-P-CCNC: 10 U/L
ANION GAP SERPL CALCULATED.3IONS-SCNC: 10 MMOL/L (ref 7–16)
AST SERPL W P-5'-P-CCNC: 17 U/L (ref 11–45)
BACTERIA #/AREA URNS HPF: ABNORMAL /HPF
BASOPHILS # BLD AUTO: 0.05 K/UL (ref 0–0.2)
BASOPHILS NFR BLD AUTO: 1 % (ref 0–1)
BILIRUB SERPL-MCNC: 0.6 MG/DL
BILIRUB UR QL STRIP: NEGATIVE
BUN SERPL-MCNC: 7 MG/DL (ref 7–19)
BUN/CREAT SERPL: 8 (ref 6–20)
CALCIUM SERPL-MCNC: 9.1 MG/DL (ref 8.4–10.2)
CHLORIDE SERPL-SCNC: 104 MMOL/L (ref 98–107)
CHOLEST SERPL-MCNC: 140 MG/DL
CHOLEST/HDLC SERPL: 3.5 {RATIO}
CLARITY UR: ABNORMAL
CO2 SERPL-SCNC: 27 MMOL/L (ref 22–29)
COLOR UR: YELLOW
CREAT SERPL-MCNC: 0.87 MG/DL (ref 0.55–1.02)
DIFFERENTIAL METHOD BLD: ABNORMAL
EGFR (NO RACE VARIABLE) (RUSH/TITUS): 85 ML/MIN/1.73M2
EOSINOPHIL # BLD AUTO: 0.04 K/UL (ref 0–0.5)
EOSINOPHIL NFR BLD AUTO: 0.8 % (ref 1–4)
ERYTHROCYTE [DISTWIDTH] IN BLOOD BY AUTOMATED COUNT: 12.6 % (ref 11.5–14.5)
EST. AVERAGE GLUCOSE BLD GHB EST-MCNC: 97 MG/DL
GLOBULIN SER-MCNC: 3.4 G/DL (ref 2–4)
GLUCOSE SERPL-MCNC: 74 MG/DL (ref 74–100)
GLUCOSE UR STRIP-MCNC: NORMAL MG/DL
HBA1C MFR BLD HPLC: 5 %
HCT VFR BLD AUTO: 38.5 % (ref 38–47)
HDLC SERPL-MCNC: 40 MG/DL (ref 35–60)
HGB BLD-MCNC: 12.9 G/DL (ref 12–16)
HIV 1+O+2 AB SERPL QL: NORMAL
IMM GRANULOCYTES # BLD AUTO: 0.02 K/UL (ref 0–0.04)
IMM GRANULOCYTES NFR BLD: 0.4 % (ref 0–0.4)
KETONES UR STRIP-SCNC: NEGATIVE MG/DL
LDLC SERPL CALC-MCNC: 81 MG/DL
LDLC/HDLC SERPL: 2 {RATIO}
LEUKOCYTE ESTERASE UR QL STRIP: ABNORMAL
LYMPHOCYTES # BLD AUTO: 2.16 K/UL (ref 1–4.8)
LYMPHOCYTES NFR BLD AUTO: 43.4 % (ref 27–41)
MCH RBC QN AUTO: 33.1 PG (ref 27–31)
MCHC RBC AUTO-ENTMCNC: 33.5 G/DL (ref 32–36)
MCV RBC AUTO: 98.7 FL (ref 80–96)
MONOCYTES # BLD AUTO: 0.3 K/UL (ref 0–0.8)
MONOCYTES NFR BLD AUTO: 6 % (ref 2–6)
MPC BLD CALC-MCNC: 10.3 FL (ref 9.4–12.4)
MUCOUS, UA: ABNORMAL /LPF
NEUTROPHILS # BLD AUTO: 2.41 K/UL (ref 1.8–7.7)
NEUTROPHILS NFR BLD AUTO: 48.4 % (ref 53–65)
NITRITE UR QL STRIP: POSITIVE
NONHDLC SERPL-MCNC: 100 MG/DL
NRBC # BLD AUTO: 0 X10E3/UL
NRBC, AUTO (.00): 0 %
PH UR STRIP: 6.5 PH UNITS
PLATELET # BLD AUTO: 269 K/UL (ref 150–400)
POTASSIUM SERPL-SCNC: 3.9 MMOL/L (ref 3.5–5.1)
PROT SERPL-MCNC: 7.6 G/DL (ref 6.4–8.3)
PROT UR QL STRIP: NEGATIVE
RBC # BLD AUTO: 3.9 M/UL (ref 4.2–5.4)
RBC # UR STRIP: ABNORMAL /UL
RBC #/AREA URNS HPF: 1 /HPF
SODIUM SERPL-SCNC: 137 MMOL/L (ref 136–145)
SP GR UR STRIP: 1.01
SQUAMOUS #/AREA URNS LPF: ABNORMAL /HPF
T4 FREE SERPL-MCNC: 0.95 NG/DL (ref 0.7–1.48)
TRIGL SERPL-MCNC: 95 MG/DL (ref 37–140)
TSH SERPL DL<=0.005 MIU/L-ACNC: 1.32 UIU/ML (ref 0.35–4.94)
UROBILINOGEN UR STRIP-ACNC: NORMAL MG/DL
VIT B12 SERPL-MCNC: 570 PG/ML (ref 213–816)
VLDLC SERPL-MCNC: 19 MG/DL
WBC # BLD AUTO: 4.98 K/UL (ref 4.5–11)
WBC #/AREA URNS HPF: 6 /HPF

## 2025-06-09 PROCEDURE — 3074F SYST BP LT 130 MM HG: CPT | Mod: ,,, | Performed by: NURSE PRACTITIONER

## 2025-06-09 PROCEDURE — 82306 VITAMIN D 25 HYDROXY: CPT | Mod: ,,, | Performed by: CLINICAL MEDICAL LABORATORY

## 2025-06-09 PROCEDURE — 3008F BODY MASS INDEX DOCD: CPT | Mod: ,,, | Performed by: NURSE PRACTITIONER

## 2025-06-09 PROCEDURE — 87077 CULTURE AEROBIC IDENTIFY: CPT | Mod: ,,, | Performed by: CLINICAL MEDICAL LABORATORY

## 2025-06-09 PROCEDURE — 3078F DIAST BP <80 MM HG: CPT | Mod: ,,, | Performed by: NURSE PRACTITIONER

## 2025-06-09 PROCEDURE — 99396 PREV VISIT EST AGE 40-64: CPT | Mod: ,,, | Performed by: NURSE PRACTITIONER

## 2025-06-09 PROCEDURE — 81001 URINALYSIS AUTO W/SCOPE: CPT | Mod: ,,, | Performed by: CLINICAL MEDICAL LABORATORY

## 2025-06-09 PROCEDURE — 87086 URINE CULTURE/COLONY COUNT: CPT | Mod: ,,, | Performed by: CLINICAL MEDICAL LABORATORY

## 2025-06-09 PROCEDURE — 87186 SC STD MICRODIL/AGAR DIL: CPT | Mod: ,,, | Performed by: CLINICAL MEDICAL LABORATORY

## 2025-06-09 PROCEDURE — 80050 GENERAL HEALTH PANEL: CPT | Mod: ,,, | Performed by: CLINICAL MEDICAL LABORATORY

## 2025-06-09 PROCEDURE — 80061 LIPID PANEL: CPT | Mod: ,,, | Performed by: CLINICAL MEDICAL LABORATORY

## 2025-06-09 PROCEDURE — 1160F RVW MEDS BY RX/DR IN RCRD: CPT | Mod: ,,, | Performed by: NURSE PRACTITIONER

## 2025-06-09 PROCEDURE — 84439 ASSAY OF FREE THYROXINE: CPT | Mod: ,,, | Performed by: CLINICAL MEDICAL LABORATORY

## 2025-06-09 PROCEDURE — 82607 VITAMIN B-12: CPT | Mod: ,,, | Performed by: CLINICAL MEDICAL LABORATORY

## 2025-06-09 PROCEDURE — 83036 HEMOGLOBIN GLYCOSYLATED A1C: CPT | Mod: ,,, | Performed by: CLINICAL MEDICAL LABORATORY

## 2025-06-09 PROCEDURE — 87389 HIV-1 AG W/HIV-1&-2 AB AG IA: CPT | Mod: ,,, | Performed by: CLINICAL MEDICAL LABORATORY

## 2025-06-09 PROCEDURE — 1159F MED LIST DOCD IN RCRD: CPT | Mod: ,,, | Performed by: NURSE PRACTITIONER

## 2025-06-09 RX ORDER — BUPROPION HYDROCHLORIDE 150 MG/1
150 TABLET ORAL DAILY
Qty: 90 TABLET | Refills: 3 | Status: SHIPPED | OUTPATIENT
Start: 2025-06-09 | End: 2026-06-09

## 2025-06-09 NOTE — ASSESSMENT & PLAN NOTE
Trial Wellbutrin- take 150mg daily after 4 weeks if symptoms continue increase dose.   Follow up in 4 weeks.

## 2025-06-09 NOTE — PROGRESS NOTES
"Subjective:       Patient ID: Ileana Braldey is a 44 y.o. female.    Chief Complaint: Follow-up (Pt Stated that she would like to have labs done today./Pt Would like to talk about other option that would hello her stop smoking.) and Anxiety    Ileana Bradley is a 44 y.o. female with chronic conditions of anemia, anxiety, current smoker who presents today to establish care, states is struggling with anxiety and depression.  Endorses PTSD like issues since several of her family members "got killed". Reports son was murdered in 2021 along with his best friend.     She reports chronic history of UTIs with urethral hernia repair. Was a previous patient of Dr. Velazquez in [place] but has not seen in several years.     Active Problem List with Overview Notes    Diagnosis Date Noted    Routine general medical examination at a health care facility 06/10/2025    Current every day smoker 06/09/2025    Anxiety 06/09/2025    Fatigue 06/09/2025    Encounter for medical examination to establish care 06/09/2025    Vitamin D deficiency 06/09/2025    Urethral diverticulum 11/08/2021    History of recurrent UTI (urinary tract infection) 04/12/2021     C/o pelvic pain with bladder filling, Progressive worsening  following hysterectomy 2017.  Other complaints of urgency, nocturia 2-3, cloudiness, gross hematuria.  Last treated for infection at initial visit.  Patient is drinking water only, frequently emptying her bladder.  States resolution of symptoms with antx.  Workup with  recently, with hx of severe endometriosis and "inflammed pelvic floor" on his exam.      Callus of foot 03/24/2021    S/P complete hysterectomy 03/24/2021    Urge incontinence 03/24/2021     Oxybutynin 5 mg started by Dr. Velazquez, which has helped less than 10%.  UUI only, no NAINA reported.   Has stopped caffeine.  Worsening since onset about one year ago.  Works at [employer] and is using pads daily.  Nocturia x 2, by limiting evening liquids.   "        Review of Systems   Constitutional:  Negative for fatigue and fever.   HENT:  Negative for congestion, hearing loss, postnasal drip, rhinorrhea, sore throat, tinnitus and voice change.    Respiratory:  Negative for apnea, cough, choking, chest tightness and shortness of breath.    Cardiovascular:  Negative for chest pain, palpitations and leg swelling.   Gastrointestinal:  Negative for abdominal pain, constipation, diarrhea and nausea.   Genitourinary:  Negative for difficulty urinating.   Neurological:  Negative for dizziness, syncope, weakness and headaches.   Psychiatric/Behavioral:  Negative for sleep disturbance.           Objective:      Vitals:    06/09/25 0935   BP: 120/61   Pulse: 76   Resp: 18   Temp: 98 °F (36.7 °C)      Physical Exam  Constitutional:       Appearance: Normal appearance. She is well-developed and normal weight.   HENT:      Head: Normocephalic.      Right Ear: External ear normal.      Left Ear: External ear normal.      Nose: Nose normal.      Mouth/Throat:      Lips: Pink.      Mouth: Mucous membranes are moist.      Pharynx: Oropharynx is clear.   Eyes:      General: Lids are normal.      Pupils: Pupils are equal, round, and reactive to light.   Cardiovascular:      Rate and Rhythm: Normal rate and regular rhythm.      Pulses: Normal pulses.      Heart sounds: Normal heart sounds.   Pulmonary:      Effort: Pulmonary effort is normal.      Breath sounds: Normal breath sounds.   Abdominal:      Palpations: Abdomen is soft.   Musculoskeletal:         General: Normal range of motion.      Cervical back: Normal range of motion.   Skin:     General: Skin is warm and dry.   Neurological:      Mental Status: She is alert and oriented to person, place, and time.   Psychiatric:         Attention and Perception: Attention normal.         Mood and Affect: Mood normal.         Speech: Speech normal.         Behavior: Behavior normal. Behavior is cooperative.       Assessment:       1.  "Routine general medical examination at a health care facility    2. Encounter for medical examination to establish care    3. Fatigue, unspecified type    4. Vitamin D deficiency    5. Anxiety    6. History of recurrent UTI (urinary tract infection)    7. Current every day smoker    8. Encounter for screening for HIV    9. Encounter for screening mammogram for malignant neoplasm of breast    10. Screening for lipoid disorders    11. Screening for diabetes mellitus    12. Positive depression screening        Plan:     Problem List Items Addressed This Visit          Psychiatric    Anxiety (Chronic)    Current Assessment & Plan   Trial Wellbutrin- take 150mg daily after 4 weeks if symptoms continue increase dose.   Follow up in 4 weeks.         Relevant Orders    Vitamin B12 (Completed)    Vitamin D (Completed)       Renal/    History of recurrent UTI (urinary tract infection) (Chronic)    Overview   C/o pelvic pain with bladder filling, Progressive worsening  following hysterectomy 2017.  Other complaints of urgency, nocturia 2-3, cloudiness, gross hematuria.  Last treated for infection at initial visit.  Patient is drinking water only, frequently emptying her bladder.  States resolution of symptoms with antx.  Workup with  recently, with hx of severe endometriosis and "inflammed pelvic floor" on his exam.         Current Assessment & Plan   Previously followed by Dr. Velazquez in [place]. Has not seen in several years. - NO issues.  Referral if needed.            Endocrine    Vitamin D deficiency    Current Assessment & Plan   Labs today- call results.         Relevant Orders    Vitamin D (Completed)       Other    Current every day smoker    Current Assessment & Plan   Smoking cessation literature provided.  Wellbutrin          Fatigue    Current Assessment & Plan   Increase activity  Weight loss  Labs  Consider sleep study.         Relevant Orders    Comprehensive Metabolic Panel (Completed)    CBC Auto " Differential (Completed)    Vitamin B12 (Completed)    Vitamin D (Completed)    Encounter for medical examination to establish care    Current Assessment & Plan   Routine labs and screenings- call results.  Trial wellbutrin  Mammogram to be scheduled.  Follow up in 4 weeks.         Relevant Orders    Mammo Digital Screening Bilat    HIV 1/2 Ag/Ab (4th Gen) (Completed)    TSH (Completed)    Lipid Panel (Completed)    Hemoglobin A1C (Completed)    Comprehensive Metabolic Panel (Completed)    CBC Auto Differential (Completed)    T4, Free (Completed)    Urinalysis, Reflex to Urine Culture (Completed)    Vitamin B12 (Completed)    Urinalysis, Microscopic (Completed)    Urine culture (Completed)    Routine general medical examination at a health care facility - Primary    Relevant Medications    buPROPion (WELLBUTRIN XL) 150 MG TB24 tablet    Other Relevant Orders    Mammo Digital Screening Bilat    HIV 1/2 Ag/Ab (4th Gen) (Completed)    TSH (Completed)    Lipid Panel (Completed)    Hemoglobin A1C (Completed)    Comprehensive Metabolic Panel (Completed)    CBC Auto Differential (Completed)    T4, Free (Completed)    Urinalysis, Reflex to Urine Culture (Completed)    Vitamin B12 (Completed)    Vitamin D (Completed)    Urinalysis, Microscopic (Completed)    Urine culture (Completed)     Other Visit Diagnoses         Encounter for screening for HIV        Relevant Orders    HIV 1/2 Ag/Ab (4th Gen) (Completed)      Encounter for screening mammogram for malignant neoplasm of breast        Relevant Orders    Mammo Digital Screening Bilat      Screening for lipoid disorders        Relevant Orders    Lipid Panel (Completed)      Screening for diabetes mellitus        Relevant Orders    Hemoglobin A1C (Completed)    Comprehensive Metabolic Panel (Completed)      Positive depression screening        I have reviewed the positive depression score which warrants active treatment with psychotherapy and/or medications.    Relevant  Medications    buPROPion (WELLBUTRIN XL) 150 MG TB24 tablet            Health Maintenance:  Health Maintenance Topics with due status: Not Due       Topic Last Completion Date    Hemoglobin A1c (Diabetic Prevention Screening) 06/09/2025    Influenza Vaccine Not Due    RSV Vaccine (Age 60+ and Pregnant patients) Not Due           Alia Miller   [facility]   6/9/25

## 2025-06-09 NOTE — ASSESSMENT & PLAN NOTE
Previously followed by Dr. Velazquez in [place]. Has not seen in several years. - NO issues.  Referral if needed.

## 2025-06-09 NOTE — ASSESSMENT & PLAN NOTE
Routine labs and screenings- call results.  Trial wellbutrin  Mammogram to be scheduled.  Follow up in 4 weeks.

## 2025-06-10 PROBLEM — Z00.00 ROUTINE GENERAL MEDICAL EXAMINATION AT A HEALTH CARE FACILITY: Status: ACTIVE | Noted: 2025-06-10

## 2025-06-11 ENCOUNTER — RESULTS FOLLOW-UP (OUTPATIENT)
Dept: FAMILY MEDICINE | Facility: CLINIC | Age: 45
End: 2025-06-11

## 2025-06-11 LAB — UA COMPLETE W REFLEX CULTURE PNL UR: ABNORMAL

## 2025-06-11 RX ORDER — ERGOCALCIFEROL 1.25 MG/1
50000 CAPSULE ORAL
Qty: 12 CAPSULE | Refills: 0 | Status: SHIPPED | OUTPATIENT
Start: 2025-06-11

## 2025-06-11 RX ORDER — NITROFURANTOIN 25; 75 MG/1; MG/1
100 CAPSULE ORAL 2 TIMES DAILY
Qty: 14 CAPSULE | Refills: 0 | Status: SHIPPED | OUTPATIENT
Start: 2025-06-11 | End: 2025-06-18

## 2025-06-11 RX ORDER — SULFAMETHOXAZOLE AND TRIMETHOPRIM 800; 160 MG/1; MG/1
1 TABLET ORAL 2 TIMES DAILY
Qty: 20 TABLET | Refills: 0 | Status: SHIPPED | OUTPATIENT
Start: 2025-06-11 | End: 2025-06-11

## 2025-07-14 ENCOUNTER — OFFICE VISIT (OUTPATIENT)
Dept: FAMILY MEDICINE | Facility: CLINIC | Age: 45
End: 2025-07-14
Payer: COMMERCIAL

## 2025-07-14 VITALS
OXYGEN SATURATION: 100 % | WEIGHT: 175.38 LBS | HEIGHT: 62 IN | HEART RATE: 73 BPM | RESPIRATION RATE: 18 BRPM | DIASTOLIC BLOOD PRESSURE: 55 MMHG | BODY MASS INDEX: 32.27 KG/M2 | SYSTOLIC BLOOD PRESSURE: 90 MMHG

## 2025-07-14 DIAGNOSIS — F41.9 ANXIETY: Primary | ICD-10-CM
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
## 2025-07-14 DIAGNOSIS — M79.604 RIGHT LEG PAIN: ICD-10-CM

## 2025-07-14 PROBLEM — Z00.00 ENCOUNTER FOR MEDICAL EXAMINATION TO ESTABLISH CARE: Status: RESOLVED | Noted: 2025-06-09 | Resolved: 2025-07-14

## 2025-07-14 PROCEDURE — 1160F RVW MEDS BY RX/DR IN RCRD: CPT | Mod: ,,, | Performed by: NURSE PRACTITIONER

## 2025-07-14 PROCEDURE — 3008F BODY MASS INDEX DOCD: CPT | Mod: ,,, | Performed by: NURSE PRACTITIONER

## 2025-07-14 PROCEDURE — 1159F MED LIST DOCD IN RCRD: CPT | Mod: ,,, | Performed by: NURSE PRACTITIONER

## 2025-07-14 PROCEDURE — 3078F DIAST BP <80 MM HG: CPT | Mod: ,,, | Performed by: NURSE PRACTITIONER

## 2025-07-14 PROCEDURE — 3044F HG A1C LEVEL LT 7.0%: CPT | Mod: ,,, | Performed by: NURSE PRACTITIONER

## 2025-07-14 PROCEDURE — 3074F SYST BP LT 130 MM HG: CPT | Mod: ,,, | Performed by: NURSE PRACTITIONER

## 2025-07-14 PROCEDURE — 99213 OFFICE O/P EST LOW 20 MIN: CPT | Mod: ,,, | Performed by: NURSE PRACTITIONER

## 2025-07-14 RX ORDER — BUPROPION HYDROCHLORIDE 300 MG/1
300 TABLET ORAL DAILY
Qty: 90 TABLET | Refills: 3 | Status: SHIPPED | OUTPATIENT
Start: 2025-07-14 | End: 2026-07-14

## 2025-07-14 RX ORDER — NAPROXEN 500 MG/1
500 TABLET ORAL 2 TIMES DAILY
Qty: 30 TABLET | Refills: 0 | Status: SHIPPED | OUTPATIENT
Start: 2025-07-14

## 2025-07-14 NOTE — PROGRESS NOTES
"Subjective:       Patient ID: Ileana Bradley is a 44 y.o. female.    Chief Complaint: Follow-up (Pt is in for a 4 week f/u), Health Maintenance (TETANUS VACCINE-pt wants /Pneumococcal Vaccines- decline /Mammogram due on 11/12/2021-scheduled /), Medication Refill, and Fall (Pt complained of falling a couple of days ago and hurting her right leg , pt mentioned she have had some swelling , bruising and severe pain in her right leg )    Patient presents to clinic for follow up starting Wellbutrin. States she increased dose 300mg daily. Anxiety/Mood changes have improved.     She does mention falling 1 week ago after losing balance on stairs. Reports falling on right leg with abrasion to knee.       Active Problem List with Overview Notes    Diagnosis Date Noted    Right leg pain 07/14/2025    Fall 07/14/2025    Routine general medical examination at a health care facility 06/10/2025    Current every day smoker 06/09/2025    Anxiety 06/09/2025    Fatigue 06/09/2025    Vitamin D deficiency 06/09/2025    Urethral diverticulum 11/08/2021    History of recurrent UTI (urinary tract infection) 04/12/2021     C/o pelvic pain with bladder filling, Progressive worsening  following hysterectomy 2017.  Other complaints of urgency, nocturia 2-3, cloudiness, gross hematuria.  Last treated for infection at initial visit.  Patient is drinking water only, frequently emptying her bladder.  States resolution of symptoms with antx.  Workup with  recently, with hx of severe endometriosis and "inflammed pelvic floor" on his exam.      Callus of foot 03/24/2021    S/P complete hysterectomy 03/24/2021    Urge incontinence 03/24/2021     Oxybutynin 5 mg started by Dr. Velazquez, which has helped less than 10%.  UUI only, no NAINA reported.   Has stopped caffeine.  Worsening since onset about one year ago.  Works at [employer] and is using pads daily.  Nocturia x 2, by limiting evening liquids.          Review of Systems "   Constitutional:  Negative for chills, fatigue and fever.   HENT:  Negative for congestion, hearing loss, postnasal drip, rhinorrhea, sore throat, tinnitus and voice change.    Respiratory:  Negative for apnea, cough, choking, chest tightness and shortness of breath.    Cardiovascular:  Negative for chest pain, palpitations and leg swelling.   Gastrointestinal:  Negative for abdominal pain, constipation, diarrhea and nausea.   Genitourinary:  Negative for difficulty urinating.   Musculoskeletal:  Positive for arthralgias and myalgias.   Neurological:  Negative for dizziness, syncope, weakness and headaches.   Psychiatric/Behavioral:  Negative for sleep disturbance.         A1C:  Recent Labs   Lab 09/20/23  1034 06/09/25  1019   Hemoglobin A1C 5.1 5.0     CBC:  Recent Labs   Lab 09/20/23  1034 06/09/25  1019   WBC 5.34 4.98   RBC 3.76 L 3.90 L   Hemoglobin 12.5 12.9   Hematocrit 35.3 L 38.5   Platelet Count 269 269   MCV 93.9 98.7 H   MCH 33.2 H 33.1 H   MCHC 35.4 33.5     CMP:  Recent Labs   Lab 09/20/23  1034 06/09/25  1019   Glucose 66 L 74   Calcium 8.5 9.1   Albumin 3.7 4.2   Total Protein 7.0 7.6   Sodium 138 137   Potassium 4.1 3.9   CO2 29 27   Chloride 106 104   BUN 5 L 7   Creatinine 0.85 0.87   Alk Phos 49 39 L   ALT 13 10   AST 6 L 17   Bilirubin, Total 0.4 0.6     LIPIDS:  Recent Labs   Lab 06/09/25  1019   TSH 1.322   HDL Cholesterol 40   Cholesterol 140   Triglycerides 95   LDL Calculated 81   Cholesterol/HDL Ratio (Risk Factor) 3.5   Non-     TSH:  Recent Labs   Lab 09/20/23  1034 06/09/25  1019   TSH 0.968 1.322        Objective:      Vitals:    07/14/25 0944   BP: (!) 90/55   Pulse: 73   Resp: 18      Physical Exam  Constitutional:       Appearance: Normal appearance. She is well-developed and normal weight.   HENT:      Head: Normocephalic.      Right Ear: External ear normal.      Left Ear: External ear normal.      Nose: Nose normal.      Mouth/Throat:      Lips: Pink.      Mouth: Mucous  membranes are moist.      Pharynx: Oropharynx is clear.   Eyes:      General: Lids are normal.      Pupils: Pupils are equal, round, and reactive to light.   Cardiovascular:      Rate and Rhythm: Normal rate and regular rhythm.      Pulses: Normal pulses.      Heart sounds: Normal heart sounds.   Pulmonary:      Effort: Pulmonary effort is normal.      Breath sounds: Normal breath sounds.   Abdominal:      Palpations: Abdomen is soft.   Musculoskeletal:         General: Normal range of motion.      Cervical back: Normal range of motion.      Right lower leg: Tenderness present.      Left lower leg: Normal.        Legs:    Skin:     General: Skin is warm and dry.   Neurological:      Mental Status: She is alert and oriented to person, place, and time.   Psychiatric:         Attention and Perception: Attention normal.         Mood and Affect: Mood normal.         Speech: Speech normal.         Behavior: Behavior normal. Behavior is cooperative.       Assessment:       1. Anxiety    2. Right leg pain    3. Fall, initial encounter        Plan:     Problem List Items Addressed This Visit          Psychiatric    Anxiety - Primary (Chronic)    Current Assessment & Plan   Increase Wellbutrin 300mg daily  Follow up in 6 mos.         Relevant Medications    buPROPion (WELLBUTRIN XL) 300 MG 24 hr tablet    naproxen (NAPROSYN) 500 MG tablet       Orthopedic    Right leg pain    Fall    Relevant Medications    naproxen (NAPROSYN) 500 MG tablet       Health Maintenance:  Health Maintenance Topics with due status: Not Due       Topic Last Completion Date    Hemoglobin A1c (Diabetic Prevention Screening) 06/09/2025    Influenza Vaccine Not Due    RSV Vaccine (Age 60+ and Pregnant patients) Not Due           Alia Miller   [facility]   7/14/25

## 2025-07-14 NOTE — LETTER
July 14, 2025      [facility]  [address] [place] [address]  Phone: [phone]  Fax: [phone]       Patient: Ileana Bradley   YOB: 1980  Date of Visit: 07/14/2025    To Whom It May Concern:    Tanesha Bradley  was at [facility] on 07/14/2025. The patient may return to work on 7/17/25 with no restrictions. If you have any questions or concerns, or if I can be of further assistance, please do not hesitate to contact me.    Sincerely,    ANDRY Grimm

## 2025-07-15 ENCOUNTER — TELEPHONE (OUTPATIENT)
Dept: FAMILY MEDICINE | Facility: CLINIC | Age: 45
End: 2025-07-15
Payer: COMMERCIAL

## 2025-07-15 DIAGNOSIS — R42 VERTIGO: Primary | ICD-10-CM

## 2025-07-15 RX ORDER — MECLIZINE HYDROCHLORIDE 25 MG/1
25 TABLET ORAL 3 TIMES DAILY PRN
Qty: 30 TABLET | Refills: 0 | Status: SHIPPED | OUTPATIENT
Start: 2025-07-15

## 2025-08-25 PROBLEM — M79.604 RIGHT LEG PAIN: Status: RESOLVED | Noted: 2025-07-14 | Resolved: 2025-08-25
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
## 2025-09-02 DIAGNOSIS — E55.9 VITAMIN D DEFICIENCY: ICD-10-CM

## 2025-09-02 RX ORDER — ERGOCALCIFEROL 1.25 MG/1
50000 CAPSULE ORAL
Qty: 12 CAPSULE | Refills: 0 | Status: SHIPPED | OUTPATIENT
Start: 2025-09-02